# Patient Record
Sex: MALE | Race: WHITE
[De-identification: names, ages, dates, MRNs, and addresses within clinical notes are randomized per-mention and may not be internally consistent; named-entity substitution may affect disease eponyms.]

---

## 2018-06-15 ENCOUNTER — HOSPITAL ENCOUNTER (EMERGENCY)
Dept: HOSPITAL 68 - ERH | Age: 38
LOS: 2 days | End: 2018-06-17
Payer: COMMERCIAL

## 2018-06-15 VITALS — HEIGHT: 70 IN | WEIGHT: 205 LBS | BODY MASS INDEX: 29.35 KG/M2

## 2018-06-15 DIAGNOSIS — F14.10: ICD-10-CM

## 2018-06-15 DIAGNOSIS — F11.10: ICD-10-CM

## 2018-06-15 DIAGNOSIS — F31.9: Primary | ICD-10-CM

## 2018-06-15 LAB
ABSOLUTE GRANULOCYTE CT: 5.9 /CUMM (ref 1.4–6.5)
BASOPHILS # BLD: 0 /CUMM (ref 0–0.2)
BASOPHILS NFR BLD: 0.4 % (ref 0–2)
EOSINOPHIL # BLD: 0.2 /CUMM (ref 0–0.7)
EOSINOPHIL NFR BLD: 2.2 % (ref 0–5)
ERYTHROCYTE [DISTWIDTH] IN BLOOD BY AUTOMATED COUNT: 13.3 % (ref 11.5–14.5)
GRANULOCYTES NFR BLD: 63.3 % (ref 42.2–75.2)
HCT VFR BLD CALC: 41 % (ref 42–52)
LYMPHOCYTES # BLD: 2.5 /CUMM (ref 1.2–3.4)
MCH RBC QN AUTO: 28.9 PG (ref 27–31)
MCHC RBC AUTO-ENTMCNC: 34 G/DL (ref 33–37)
MCV RBC AUTO: 85 FL (ref 80–94)
MONOCYTES # BLD: 0.7 /CUMM (ref 0.1–0.6)
PLATELET # BLD: 323 /CUMM (ref 130–400)
PMV BLD AUTO: 7.8 FL (ref 7.4–10.4)
RED BLOOD CELL CT: 4.83 /CUMM (ref 4.7–6.1)
WBC # BLD AUTO: 9.3 /CUMM (ref 4.8–10.8)

## 2018-06-15 PROCEDURE — G0480 DRUG TEST DEF 1-7 CLASSES: HCPCS

## 2018-06-15 PROCEDURE — G0463 HOSPITAL OUTPT CLINIC VISIT: HCPCS
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
## 2018-06-16 LAB — LITHIUM SERPL-SCNC: < 0.2 MMOL/L (ref 0.6–1.2)
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
## 2018-06-17 VITALS — SYSTOLIC BLOOD PRESSURE: 146 MMHG | DIASTOLIC BLOOD PRESSURE: 88 MMHG

## 2018-06-17 NOTE — ED PSYCHIATRIST/APRN CONSULT
Psychiatrist/APRN ED Consult
Assessment and Plan:
ED Psychiatry consult
 
I re-evaluated Mr. Soto this morning, accompanied by Kamla Roque LCSW.
 
Briefly, he is a 36 y/o unemployed  man with history of antisocial 
behavior, substance abuse disorders (on methadone maintenance), and unspecified 
mood disorders, who presented to [facility] emergency Department of his 
own accord late in the evening on Friday, Sarah 15.  Upon his arrival, his urine 
drug screen was positive for cocaine, methadone (Rx'd by [facility] [place]), and 
benzodiazepines. He had a chief complaint that he was hearing voices and that he
was thinking about killing himself, and he was maintained in the emergency 
department for further evaluation the following day.  He reports that he was 
released from retirement, [facility] in [place], approximately one week prior, 
at that time he returned to his home in the [place] area to live with 
his girlfriend.  He reports having an argument with his girlfriend, Selma, 
which he says was due to his speaking with another female acquaintance.  Due to 
the argument, he left the home, and began using substances, including snorting 
cocaine.  This is consistent with his long history of substance use disorders.  
He notes that at that time he was essentially homeless and ultimately presented 
to the hospital as noted above.
 
Yesterday, the patient continued to present in fair behavioral control.  However
, due to the fact that the patient presented of his own accord, rather than on a
holding paper such as a PEER, and the treatment team desired to observe the 
patient further given the serious nature of his chief complaint, he was placed 
on a physician's emergency certificate, with intention for hospitalization 
should the symptoms and risk remain.  He was given his home medication of 
clonazepam as well as Zyprexa due to his description of hearing voices.  He did 
not require any emergency medications for agitation or violent behavior.
 
This morning, after an uneventful overnight, the patient asked the crisis 
clinician to leave the hospital, stating that he does not want to be 
hospitalized in an inpatient psychiatric unit.  This M.D. interviewed the 
patient face-to-face, accompanied by crisis  Kamla Roque.  The 
patient was in good behavioral control throughout this interview.  He reports 
that he presented to the hospital following using cocaine for 2 days prior, as 
well as status post the above noted argument with his girlfriend, and therefore,
did not have a place to stay.  "I knew that if I showed up to the hospital and 
said that I was suicidal that they would have to keep me."  "I actually never 
had any intent on harming myself, I made it all up because I needed a place to 
stay."  "I also don't hear voices, I said that also knowing that that would make
you have to keep me."  He reports that he has no current thoughts about harming 
himself, no thoughts about harming others, including his girlfriend, and that he
has no access to firearms at his home or elsewhere.  "I'm a convicted felon, I 
can't go anywhere near guns."  Zeb tells me that his plan, after lengthy 
discussion with his girlfriend Selma, is to return home to his home in the 
[place] area.  He reports that he has follow-up psychiatric treatment through 
[facility] "sometime in a few weeks".  He was able to describe to me in 
a factual and logical manner steps from leaving [facility], obtaining 
money through why her transfer from his girlfriend, and taking the train from 
[place] all the way to [place], where he will then get transportation back to his 
home.  I asked him why he was not interested in inpatient psychiatric 
hospitalization, and he stated that he would rather be home with his girlfriend 
"now that we've made up ", as well as to "go to an AA meeting with a friend [of 
his]", who he considers to be a sponsor of some sort.  We described the 
importance of obtaining immediate follow-up treatment, and provided him with 
numerous resources near to where he lives in [place] [facility] [place].
 
Mental status exam: Zeb is an overweight, moderately groomed  man of
apparent stated age, with good eye contact, mildly hypermotoric, speech is 
mildly increased in amount, increased in volume, mildly pressured but fully 
interruptible.  Mood was "I feel fine today ", affect is slightly expansive, non
-labile.  Thought process is logical and linear, content is without any suicidal
ideation, intent, or plan.  Denies any homicidal ideation.  He denies any 
perceptual disturbances.  His cognition is noted to be alert and oriented to 
person, place, and date, his insight and judgment is deemed to be fair.
 
All laboratory values and studies were reviewed.
 
Collateral was obtained from the patient's girlfriend, Brisa.  Please see 
[facility] for details.  Briefly, this collateral 
demonstrates that the patient's girlfriend is accepting of this patient back in 
her home, she does not feel her safety is threatened by this individual, and she
has the ability to contact emergency medical services, should these be needed in
the near future.
 
Assessment and recommendations: This is a 37-year-old man recently released from
9 months in retirement who presented to the [facility] emergency department 
intoxicated with stimulants, threatening to harm himself.  It is now apparent 
that this threat was a clear episode of malingering whereby the patient was 
feigning psychiatric symptoms in order to secure a bed indoors for the evening, 
in the context of transient homelessness due to an argument with his girlfriend.
 It is likely that some of the pressured behavior that the patient demonstrated 
and poor decision making is a consequence of his cocaine and other substance 
use.  While this is clearly a maladaptive way to cope with his psychosocial 
difficulties, it also demonstrates the patient is able to secure services, and 
the transient nature of his mental status changes is fully explicable by his 
substance abuse, most notably cocaine.
 
Given that the patient came to the hospital of his own accord, that the safety 
threat appeared to be fully malingered, and that he no longer demonstrates any 
thoughts of harming himself or others, nor does he appear gravely disabled, he 
will be discharged from the emergency department.  His plan will be returning to
his home in the [place] area with his girlfriend.  He currently 
reports having follow-up at [facility], however, he has been given a 
number of resources by a crisis  if he needs to obtain services 
sooner.  He was also instructed in the use of 211 to obtain further services for
either psychiatric treatment or housing difficulties.  He was again instructed 
to return to the emergency department or call 911 in the case of any psychiatric
or medical emergency in the near future.
 
Diagnoses:
 
Malingering
Stimulant use disorder, severe
Opiate use disorder, severe, on methadone maintenance
Sedative hypnotic use disorder, severe
Rule out antisocial personality disorder
Per chart: history of unspecified bipolar disorder

## 2018-06-17 NOTE — ED PSY CRISIS COLLATERAL NOTE
Collateral Note
 
Collateral Note
Family/Inform/Barbara Contacts:
Spoke with Girlfriend with whom pt. lives on the morning of 6/17/18.  Pt. 
reported that he had a fight with the gf the night before and "wanted to get 
away from her to clear his head" last night.  He lives with her in [place]/
[place], [place].  Girlfriend, Selma Perez, said that she is ok with pt. coming 
home but that he "has to stop using the methadone and klonpin at the same time".
 She reported that he "could take a whole bottle of Klonpin in a day" and that 
he "addictively" takes the klonopin with his methadone and "nods off".  Pt. who 
was listening to the phone call said that he plans to stop using klonopin.  She 
agreed to take him back into her home and expressed feeling ready and that he 
was safe to come home.  He agreed to follow up with treatment and "find a way 
home to [place] on the train".